# Patient Record
Sex: MALE | Race: OTHER | Employment: FULL TIME | URBAN - METROPOLITAN AREA
[De-identification: names, ages, dates, MRNs, and addresses within clinical notes are randomized per-mention and may not be internally consistent; named-entity substitution may affect disease eponyms.]

---

## 2020-09-01 ENCOUNTER — OFFICE VISIT (OUTPATIENT)
Dept: ENT CLINIC | Age: 45
End: 2020-09-01
Payer: COMMERCIAL

## 2020-09-01 VITALS — SYSTOLIC BLOOD PRESSURE: 132 MMHG | DIASTOLIC BLOOD PRESSURE: 83 MMHG | TEMPERATURE: 97.8 F | HEART RATE: 69 BPM

## 2020-09-01 PROBLEM — H93.13 SUBJECTIVE TINNITUS OF BOTH EARS: Status: ACTIVE | Noted: 2020-09-01

## 2020-09-01 PROBLEM — H60.8X3 CHRONIC ECZEMATOID OTITIS EXTERNA OF BOTH EARS: Status: ACTIVE | Noted: 2020-09-01

## 2020-09-01 PROCEDURE — 99203 OFFICE O/P NEW LOW 30 MIN: CPT | Performed by: OTOLARYNGOLOGY

## 2020-09-01 RX ORDER — HYDROCHLOROTHIAZIDE 12.5 MG/1
CAPSULE, GELATIN COATED ORAL
COMMUNITY
Start: 2020-08-17

## 2020-09-01 RX ORDER — FLUOCINOLONE ACETONIDE 0.11 MG/ML
OIL AURICULAR (OTIC)
Qty: 1 BOTTLE | Refills: 2 | Status: SHIPPED | OUTPATIENT
Start: 2020-09-01

## 2020-09-01 RX ORDER — LISINOPRIL 5 MG/1
TABLET ORAL
COMMUNITY
Start: 2020-08-20

## 2020-09-01 NOTE — PROGRESS NOTES
[facility]       NEW PATIENT VISIT    PCP:  No primary care provider on file. REFERRED BY:   self      CHIEF COMPLAINT:  Chief Complaint   Patient presents with    Tinnitus    Ear Problem     Itching of ear canals with dry skin. HISTORY OF PRESENT ILLNESS:       Yen Delgado is a 53 y.o. male here for evaluation and treatment of a problem located in both ears. The quality is ringing tinnitus. The severity is mild to moderate. The duration is forever, but I just ignore it because not hat loud, but worse louder for 3-4 weeks. The timing is constant. The context is no antecedent illness or injury. Modifying factors include none. Associated symptoms include none. 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 Never detected hearing loss. His second complaint is excessive itching in the ear. He stated that \"the outer ear canal is itchy, and the skin dries up. I've been putting my wife's body lotion or petroleum jelly in my ear. It may help, maybe, because if I forget, it bothers me all day with itching. \"  He stated that he frequently uses Q-tips to clean his ears. Work:  Manufacturing support, fixing equipment machines set up. Some moodwokring with ear muffs. No othre noise exposure. REVIEW OF SYSTEMS:    CONSTITUTIONAL:  Denied fever. Denied unexplained weight loss, over 20 pounds in the past six months. EARS, NOSE, THROAT:  Denied otorrhea, otalgia, hearing loss, epistaxis, nasal dyspnea, rhinorrhea, sore throat and hoarseness.         PAST MEDICAL HISTORY:    Past Medical History:   Diagnosis Date    Low testosterone          Past Surgical History:   Procedure Laterality Date    CARPAL TUNNEL RELEASE Right 12/10/2013     CARPAL TUNNEL RELEASE               VARIOCOCELE REPAIR             EXAMINATION:    Vitals:    09/01/20 1601   BP: 132/83   Pulse: 69   Temp: 97.8 °F (36.6 °C)     VITALS SIGNS were reviewed. GENERAL APPEARANCE: WDWN NAD, Alert and oriented X 3. EYES:  Extraocular motion was intact, bilaterally. Normal primary gaze alignment. Sclera and conjunctiva clear bilaterally. ABILITY TO COMMUNICATE/QUALITY OF VOICE:  Normal, no hoarseness or hot potato quality. SALIVARY GLANDS:  Parotid gland and submandibular gland normal bilaterally. INSPECTION, HEAD AND FACE:  Normal with no masses, lesions, tenderness, or deformities. FACIAL STRENGTH, MOTION:  Facial nerve function intact and equal bilaterally for all 5 branches. SINUSES:  Frontal sinuses and maxillary sinuses nontender, bilaterally. HEARING ASSESSMENT:  Hearing was intact to finger rub at the external meatus bilaterally. Tuning fork tests, using a 512 Hz tuning fork, showed the Gonzalez test was heard in the midline. The Rinne test showed air conduction greater than bone conduction bilaterally. EXTERNAL EAR/NOSE:  The pinnae, mastoids, and external nose were normal bilaterally. (+) EARS, OTOMICROSCOPY: The external auditory canals were dry with scant cerumen and with skin debris and changes consistent with chronic eczematoid otitis externa. The tympanic membranes appeared to be normal bilaterally, including normal pneumatic mobility.  (+) NOSE:  The nasal septum was mild deviated to left. The inferior turbinates were normal bilaterally. Nasal mucosa and nasal secretions were normal bilaterally. LIPS, TEETH AND GUMS:  Normal.  OROPHARYNX/ORAL CAVITY:  Normal mucosa with no ulcerations, masses, lesions, erythema, exudate, or other abnormalities. UMESH:  Normal with no masses, tenderness, or tracheal deviation, and with normal laryngeal crepitus. THYROID:  Normal, with no nodules, goiter, or tenderness bilaterally. LYMPH NODES, CERVICAL, FACIAL AND SUPRACLAVICULAR:  No lymphadenopathy detected. IMPRESSION / Lakhwinder Aj / Abraham Kidd:       Bisi Mensah was seen today for tinnitus and ear problem.     Diagnoses and all orders for this visit:    Subjective tinnitus of both ears    Chronic eczematoid otitis externa of both ears  -     fluocinolone (DERMOTIC) 0.01 % OIL oil; Place 5 drops in the affected ear(s) 2 times daily, for 7 to 14 days, as needed for dermatitis or itching. If symptoms persists longer than 14 days, call office for appointment. RECOMMENDATIONS/PLAN:    1. Audiogram, complete audiometric testing, air, bone, speech, reflexes. Return for recheck/follow-up after hearing test.      Patient Instructions   NO Q-TIPS OR OTHER INSTRUMENTS/OBJECTS IN THE EARS   You should never clean your ears with a Q-tip, cotton tipped applicator, Zeina pin, paper clip, pen cap, nail file, or any other instrument. This will tend to push wax in deeper and pack the ear canal with wax. There is a high risk and danger of this practice, especially rupture of ear drum, dislocation or other damage to ossicles, and permanent, irreversible, and irreparable hearing loss and/or dizziness. I recommend only use of one the several ear wax removal kits available \"over the counter\" if you feel a need to try to remove ear wax. For example, Murine, Bausch and Lomb, NeilMed, or Debrox ear wax removal kits may be used for ear wax removal, as needed. No other methods should be self used for cleaning your ears.

## 2020-09-01 NOTE — PATIENT INSTRUCTIONS
NO Q-TIPS OR OTHER INSTRUMENTS/OBJECTS IN THE EARS   You should never clean your ears with a Q-tip, cotton tipped applicator, Zeina pin, paper clip, pen cap, nail file, or any other instrument. This will tend to push wax in deeper and pack the ear canal with wax. There is a high risk and danger of this practice, especially rupture of ear drum, dislocation or other damage to ossicles, and permanent, irreversible, and irreparable hearing loss and/or dizziness. I recommend only use of one the several ear wax removal kits available \"over the counter\" if you feel a need to try to remove ear wax. For example, Murine, Bausch and Lomb, NeilMed, or Debrox ear wax removal kits may be used for ear wax removal, as needed. No other methods should be self used for cleaning your ears.

## 2020-09-11 ENCOUNTER — PROCEDURE VISIT (OUTPATIENT)
Dept: AUDIOLOGY | Age: 45
End: 2020-09-11
Payer: COMMERCIAL

## 2020-09-11 PROCEDURE — 92557 COMPREHENSIVE HEARING TEST: CPT | Performed by: AUDIOLOGIST

## 2020-09-11 NOTE — PROGRESS NOTES
AUDIOLOGIC AND OTHER PERTINENT MEDICAL HISTORY:      Lakeshia Hermitage was seen for hearing and middle ear pressure evals. [facility] is referral source of todays appointment. Patient presents with tinnitus concerns secondary to hearing loss. ASSESSMENT AND FINDINGS:     Audiogram demonstrates essentially normal hearing with mild degree of high frequency loss . Patient demonstrated good speech understanding in quiet, at elevated levels.              Immittance: unavailable at this time     Chart cc'd to: Jessica Nair MD      Degree of   Hearing Sensitivity dB Range   Within Normal Limits (WNL) 0 - 20   Mild 20 - 40   Moderate 40 - 55   Moderately-Severe 55 - 70   Severe 70 - 90   Profound 90 +       RECOMMENDATIONS:                                                                 ENT to advise treatment plan

## 2020-10-05 ENCOUNTER — OFFICE VISIT (OUTPATIENT)
Dept: ENT CLINIC | Age: 45
End: 2020-10-05
Payer: COMMERCIAL

## 2020-10-05 VITALS — HEART RATE: 72 BPM | DIASTOLIC BLOOD PRESSURE: 77 MMHG | SYSTOLIC BLOOD PRESSURE: 115 MMHG | TEMPERATURE: 98.4 F

## 2020-10-05 PROCEDURE — 99214 OFFICE O/P EST MOD 30 MIN: CPT | Performed by: OTOLARYNGOLOGY
